# Patient Record
Sex: MALE | Race: WHITE | Employment: FULL TIME | URBAN - NONMETROPOLITAN AREA
[De-identification: names, ages, dates, MRNs, and addresses within clinical notes are randomized per-mention and may not be internally consistent; named-entity substitution may affect disease eponyms.]

---

## 2017-11-12 ENCOUNTER — HOSPITAL ENCOUNTER (EMERGENCY)
Age: 22
Discharge: HOME | End: 2017-11-12
Payer: COMMERCIAL

## 2017-11-12 VITALS
TEMPERATURE: 98.3 F | WEIGHT: 220 LBS | HEIGHT: 69 IN | SYSTOLIC BLOOD PRESSURE: 118 MMHG | RESPIRATION RATE: 16 BRPM | BODY MASS INDEX: 32.58 KG/M2 | DIASTOLIC BLOOD PRESSURE: 56 MMHG | OXYGEN SATURATION: 97 % | HEART RATE: 68 BPM

## 2017-11-12 DIAGNOSIS — M25.562 ACUTE PAIN OF LEFT KNEE: Primary | ICD-10-CM

## 2017-11-12 PROCEDURE — 99202 OFFICE O/P NEW SF 15 MIN: CPT | Performed by: NURSE PRACTITIONER

## 2017-11-12 PROCEDURE — 99202 OFFICE O/P NEW SF 15 MIN: CPT

## 2017-11-12 RX ORDER — NAPROXEN 500 MG/1
500 TABLET ORAL 2 TIMES DAILY WITH MEALS
Qty: 60 TABLET | Refills: 0 | Status: SHIPPED | OUTPATIENT
Start: 2017-11-12 | End: 2021-01-18 | Stop reason: ALTCHOICE

## 2017-11-12 RX ORDER — PREDNISONE 20 MG/1
20 TABLET ORAL 2 TIMES DAILY
Qty: 10 TABLET | Refills: 0 | Status: SHIPPED | OUTPATIENT
Start: 2017-11-12 | End: 2017-11-17

## 2017-11-12 ASSESSMENT — ENCOUNTER SYMPTOMS
CHEST TIGHTNESS: 0
DIARRHEA: 0
EYE PAIN: 0
BACK PAIN: 1
COUGH: 0
CONSTIPATION: 0
ABDOMINAL PAIN: 0
EYE REDNESS: 0
SHORTNESS OF BREATH: 0

## 2017-11-12 ASSESSMENT — PAIN SCALES - GENERAL: PAINLEVEL_OUTOF10: 4

## 2017-11-12 ASSESSMENT — PAIN DESCRIPTION - LOCATION: LOCATION: LEG

## 2017-11-12 NOTE — ED PROVIDER NOTES
[facility] [place] [facility]  Urgent Care Encounter      CHIEF COMPLAINT       Chief Complaint   Patient presents with    Leg Pain     left    Back Pain       Nurses Notes reviewed and I agree except as noted in the HPI. HISTORY OF PRESENT ILLNESS   Alize Tee is a 25 y.o. male who presents   Back Pain   Associated symptoms: no abdominal pain and no chest pain    Here for back and left leg pain. Has radiating pain from the hip to the knee. No recent falls or injuries. He denies any twisting or lifting injuries. 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 No surgeries to the back or the leg. Denies numbness or tingling. He was using a heating pad to the hip for some time and this has not helped. REVIEW OF SYSTEMS     Review of Systems   Constitutional: Positive for activity change. HENT: Negative. Eyes: Negative for pain and redness. Respiratory: Negative for cough, chest tightness and shortness of breath. Cardiovascular: Negative for chest pain. Gastrointestinal: Negative for abdominal pain, constipation and diarrhea. Endocrine: Negative for cold intolerance and heat intolerance. Genitourinary: Negative for difficulty urinating. Musculoskeletal: Positive for back pain. Skin: Negative for pallor. Allergic/Immunologic: Negative for environmental allergies and food allergies. Neurological: Negative for dizziness. Hematological: Negative for adenopathy. Psychiatric/Behavioral: Negative. PAST MEDICAL HISTORY   No past medical history on file. SURGICAL HISTORY     Patient  has a past surgical history that includes Appendectomy. CURRENT MEDICATIONS       Previous Medications    FLUTICASONE-SALMETEROL (ADVAIR HFA IN)    Inhale into the lungs       ALLERGIES     Patient is has No Known Allergies. FAMILY HISTORY     Patient's family history is not on file. SOCIAL HISTORY     Patient  reports that he has been smoking.   He does not have any smokeless tobacco SUPPORTS (KNEE BRACE/FLEX STAYS LARGE) MISC    1 Device by Does not apply route daily    NAPROXEN (NAPROSYN) 500 MG TABLET    Take 1 tablet by mouth 2 times daily (with meals)    PREDNISONE (DELTASONE) 20 MG TABLET    Take 1 tablet by mouth 2 times daily for 5 days     Current Discharge Medication List          NASEEM Tapia CNP  11/12/17 1242

## 2017-11-12 NOTE — ED NOTES
Pt discharged. Discharge assessments completed. No changes. All discharge education and information given. Pt instructed to go to ED for any shortness of breath, chest pain or abd pain. Verbalized understanding. Left stable.      Lily Donovan LPN  08/54/14 5506

## 2018-02-23 ENCOUNTER — APPOINTMENT (OUTPATIENT)
Dept: GENERAL RADIOLOGY | Age: 23
End: 2018-02-23
Payer: COMMERCIAL

## 2018-02-23 ENCOUNTER — HOSPITAL ENCOUNTER (EMERGENCY)
Age: 23
Discharge: HOME | End: 2018-02-23
Payer: COMMERCIAL

## 2018-02-23 VITALS
RESPIRATION RATE: 16 BRPM | OXYGEN SATURATION: 98 % | TEMPERATURE: 98.2 F | BODY MASS INDEX: 33.97 KG/M2 | DIASTOLIC BLOOD PRESSURE: 70 MMHG | HEART RATE: 98 BPM | SYSTOLIC BLOOD PRESSURE: 148 MMHG | WEIGHT: 230 LBS

## 2018-02-23 DIAGNOSIS — R03.0 ELEVATED BP WITHOUT DIAGNOSIS OF HYPERTENSION: ICD-10-CM

## 2018-02-23 DIAGNOSIS — R07.9 CHEST PAIN, UNSPECIFIED TYPE: Primary | ICD-10-CM

## 2018-02-23 LAB
ALBUMIN SERPL-MCNC: 4.5 G/DL (ref 3.5–5.1)
ALP BLD-CCNC: 42 U/L (ref 38–126)
ALT SERPL-CCNC: 74 U/L (ref 11–66)
AMPHETAMINE+METHAMPHETAMINE URINE SCREEN: NEGATIVE
ANION GAP SERPL CALCULATED.3IONS-SCNC: 14 MEQ/L (ref 8–16)
AST SERPL-CCNC: 45 U/L (ref 5–40)
BARBITURATE QUANTITATIVE URINE: NEGATIVE
BASOPHILS # BLD: 0.9 %
BASOPHILS ABSOLUTE: 0.1 THOU/MM3 (ref 0–0.1)
BENZODIAZEPINE QUANTITATIVE URINE: NEGATIVE
BILIRUB SERPL-MCNC: 0.6 MG/DL (ref 0.3–1.2)
BILIRUBIN DIRECT: < 0.2 MG/DL (ref 0–0.3)
BILIRUBIN URINE: NEGATIVE
BLOOD, URINE: NEGATIVE
BUN BLDV-MCNC: 10 MG/DL (ref 7–22)
CALCIUM SERPL-MCNC: 9.3 MG/DL (ref 8.5–10.5)
CANNABINOID QUANTITATIVE URINE: NEGATIVE
CHARACTER, URINE: CLEAR
CHLORIDE BLD-SCNC: 100 MEQ/L (ref 98–111)
CO2: 26 MEQ/L (ref 23–33)
COCAINE METABOLITE QUANTITATIVE URINE: NEGATIVE
COLOR: YELLOW
CREAT SERPL-MCNC: 0.8 MG/DL (ref 0.4–1.2)
D-DIMER QUANTITATIVE: < 215 NG/ML FEU (ref 0–500)
EKG ATRIAL RATE: 104 BPM
EKG P AXIS: 18 DEGREES
EKG P-R INTERVAL: 134 MS
EKG Q-T INTERVAL: 340 MS
EKG QRS DURATION: 68 MS
EKG QTC CALCULATION (BAZETT): 447 MS
EKG R AXIS: 58 DEGREES
EKG T AXIS: 9 DEGREES
EKG VENTRICULAR RATE: 104 BPM
EOSINOPHIL # BLD: 2.3 %
EOSINOPHILS ABSOLUTE: 0.2 THOU/MM3 (ref 0–0.4)
ETHYL ALCOHOL, SERUM: < 0.01 %
FLU A ANTIGEN: NEGATIVE
FLU B ANTIGEN: NEGATIVE
GFR SERPL CREATININE-BSD FRML MDRD: > 90 ML/MIN/1.73M2
GLUCOSE BLD-MCNC: 83 MG/DL (ref 70–108)
GLUCOSE URINE: NEGATIVE MG/DL
HCT VFR BLD CALC: 40.1 % (ref 42–52)
HEMOGLOBIN: 14.1 GM/DL (ref 14–18)
KETONES, URINE: ABNORMAL
LEUKOCYTE ESTERASE, URINE: NEGATIVE
LIPASE: 20.4 U/L (ref 5.6–51.3)
LYMPHOCYTES # BLD: 28.1 %
LYMPHOCYTES ABSOLUTE: 2.3 THOU/MM3 (ref 1–4.8)
MCH RBC QN AUTO: 30.6 PG (ref 27–31)
MCHC RBC AUTO-ENTMCNC: 35.1 GM/DL (ref 33–37)
MCV RBC AUTO: 87.1 FL (ref 80–94)
MONOCYTES # BLD: 7.9 %
MONOCYTES ABSOLUTE: 0.6 THOU/MM3 (ref 0.4–1.3)
NITRITE, URINE: NEGATIVE
NUCLEATED RED BLOOD CELLS: 0 /100 WBC
OPIATES, URINE: NEGATIVE
OSMOLALITY CALCULATION: 277.6 MOSMOL/KG (ref 275–300)
OXYCODONE: NEGATIVE
PDW BLD-RTO: 12.5 % (ref 11.5–14.5)
PH UA: 6
PHENCYCLIDINE QUANTITATIVE URINE: NEGATIVE
PLATELET # BLD: 216 THOU/MM3 (ref 130–400)
PMV BLD AUTO: 9 FL (ref 7.4–10.4)
POTASSIUM SERPL-SCNC: 3.5 MEQ/L (ref 3.5–5.2)
PROTEIN UA: NEGATIVE
RBC # BLD: 4.61 MILL/MM3 (ref 4.7–6.1)
SEG NEUTROPHILS: 60.8 %
SEGMENTED NEUTROPHILS ABSOLUTE COUNT: 5 THOU/MM3 (ref 1.8–7.7)
SODIUM BLD-SCNC: 140 MEQ/L (ref 135–145)
SPECIFIC GRAVITY, URINE: 1.02 (ref 1–1.03)
TOTAL PROTEIN: 6.9 G/DL (ref 6.1–8)
TROPONIN T: < 0.01 NG/ML
TROPONIN T: < 0.01 NG/ML
TSH SERPL DL<=0.05 MIU/L-ACNC: 2.79 UIU/ML (ref 0.4–4.2)
UROBILINOGEN, URINE: 1 EU/DL
WBC # BLD: 8.2 THOU/MM3 (ref 4.8–10.8)

## 2018-02-23 PROCEDURE — 87804 INFLUENZA ASSAY W/OPTIC: CPT

## 2018-02-23 PROCEDURE — 83690 ASSAY OF LIPASE: CPT

## 2018-02-23 PROCEDURE — 81003 URINALYSIS AUTO W/O SCOPE: CPT

## 2018-02-23 PROCEDURE — 84484 ASSAY OF TROPONIN QUANT: CPT

## 2018-02-23 PROCEDURE — 84443 ASSAY THYROID STIM HORMONE: CPT

## 2018-02-23 PROCEDURE — 71046 X-RAY EXAM CHEST 2 VIEWS: CPT

## 2018-02-23 PROCEDURE — G0480 DRUG TEST DEF 1-7 CLASSES: HCPCS

## 2018-02-23 PROCEDURE — 80307 DRUG TEST PRSMV CHEM ANLYZR: CPT

## 2018-02-23 PROCEDURE — 82248 BILIRUBIN DIRECT: CPT

## 2018-02-23 PROCEDURE — 93005 ELECTROCARDIOGRAM TRACING: CPT | Performed by: PHYSICIAN ASSISTANT

## 2018-02-23 PROCEDURE — 85025 COMPLETE CBC W/AUTO DIFF WBC: CPT

## 2018-02-23 PROCEDURE — 85379 FIBRIN DEGRADATION QUANT: CPT

## 2018-02-23 PROCEDURE — 36415 COLL VENOUS BLD VENIPUNCTURE: CPT

## 2018-02-23 PROCEDURE — 99285 EMERGENCY DEPT VISIT HI MDM: CPT

## 2018-02-23 PROCEDURE — 80053 COMPREHEN METABOLIC PANEL: CPT

## 2018-02-23 RX ORDER — KETOROLAC TROMETHAMINE 30 MG/ML
30 INJECTION, SOLUTION INTRAMUSCULAR; INTRAVENOUS ONCE
Status: DISCONTINUED | OUTPATIENT
Start: 2018-02-23 | End: 2018-02-24 | Stop reason: HOSPADM

## 2018-02-23 RX ORDER — IBUPROFEN 600 MG/1
600 TABLET ORAL EVERY 6 HOURS PRN
Qty: 30 TABLET | Refills: 0 | Status: SHIPPED | OUTPATIENT
Start: 2018-02-23 | End: 2021-01-18 | Stop reason: ALTCHOICE

## 2018-02-23 ASSESSMENT — ENCOUNTER SYMPTOMS
NAUSEA: 0
ABDOMINAL PAIN: 0
BACK PAIN: 0
COUGH: 0
COLOR CHANGE: 0
SORE THROAT: 0
CONSTIPATION: 0
DIARRHEA: 0
EYE REDNESS: 0
VOMITING: 0
EYE DISCHARGE: 0
WHEEZING: 0
RHINORRHEA: 0

## 2018-02-24 PROCEDURE — 93010 ELECTROCARDIOGRAM REPORT: CPT | Performed by: INTERNAL MEDICINE

## 2018-02-24 ASSESSMENT — HEART SCORE: ECG: 1

## 2018-02-24 ASSESSMENT — ENCOUNTER SYMPTOMS: SHORTNESS OF BREATH: 1

## 2018-02-24 NOTE — ED PROVIDER NOTES
this chart in the absence of a cardiologist.    EKG 12 Lead (Edited Result - FINAL)   Component (Lab Inquiry)   Collection Time Result Time Ventricular Rate Atrial Rate P-R Interval QRS Duration Q-T Interval   02/23/18 20:41:42 02/23/18 20:41:42 104 104 134 68 340       Collection Time Result Time QTc Calculation (Bazett) P Axis R Axis T Axis   02/23/18 20:41:42 02/23/18 20:41:42 447 18 58 9       Final result                Narrative:    Sinus tachycardia  Nonspecific T wave abnormality  Abnormal ECG  No previous ECGs available  Confirmed by [address] [place] [address] ([id number]) on 2/24/2018 1:01:36 AM                RADIOLOGY: non-plain film images(s) such as CT, Ultrasound and MRI are read by the radiologist.    XR CHEST STANDARD (2 VW)   Final Result      No acute cardiopulmonary disease. **This report has been created using voice recognition software. It may contain minor errors which are inherent in voice recognition technology. **      Final report electronically signed by Dr. Marjorie Nielson on 2/23/2018 9:44 PM          LABS:     Labs Reviewed   CBC WITH AUTO DIFFERENTIAL - Abnormal; Notable for the following:        Result Value    RBC 4.61 (*)     Hematocrit 40.1 (*)     All other components within normal limits   HEPATIC FUNCTION PANEL - Abnormal; Notable for the following:     AST 45 (*)     ALT 74 (*)     All other components within normal limits   URINE RT REFLEX TO CULTURE - Abnormal; Notable for the following:     Ketones, Urine TRACE (*)     All other components within normal limits   RAPID INFLUENZA A/B ANTIGENS   D-DIMER, QUANTITATIVE   BASIC METABOLIC PANEL   LIPASE   TROPONIN   TSH WITHOUT REFLEX   URINE DRUG SCREEN   ETHANOL   ANION GAP   GLOMERULAR FILTRATION RATE, ESTIMATED   OSMOLALITY   TROPONIN       EMERGENCY DEPARTMENT COURSE:   Vitals:    Vitals:    02/23/18 2042 02/23/18 2129 02/23/18 2214 02/23/18 2337   BP: (!) 158/96 132/74 (!) 126/58 (!) 148/70   Pulse: 103 101 95 98   Resp: 12 18 16 16 He will otherwise follow up at a scheduled Cardiology outpatient follow up appointment on 2/26/18 at 1000. I estimate there is LOW risk for PULMONARY EMBOLISM, ACUTE CORONARY SYNDROME, OR THORACIC AORTIC DISSECTION, thus I consider the discharge disposition reasonable. The patient and I have discussed the diagnosis and risks, and we agree with discharging home to follow-up with his primary doctor. We also discussed returning to the Emergency Department immediately if new or worsening symptoms occur. We have discussed the symptoms which are most concerning (e.g., bloody sputum, fever, worsening pain or shortness of breath, vomiting) that necessitate immediate return. CRITICAL CARE:   None     CONSULTS:  None    PROCEDURES:  None    FINAL IMPRESSION      1. Chest pain, unspecified type    2. Elevated BP without diagnosis of hypertension          DISPOSITION/PLAN   I have given the patient strict written and verbal instructions about care at home, follow-up, and signs and symptoms of worsening of condition, and the patient did verbalize understanding of these instructions. Patient was discharged in stable condition. Will return if symptoms change or worsen, or for any sign or symptom deemed emergent by the patient or family members. Follow up as an outpatient or sooner if symptoms warrant.      PATIENT REFERRED TO:  [facility] [address] [place] [address]  Go on 2/26/2018  For re-check      DISCHARGE MEDICATIONS:  Discharge Medication List as of 2/23/2018 11:45 PM      START taking these medications    Details   ibuprofen (ADVIL;MOTRIN) 600 MG tablet Take 1 tablet by mouth every 6 hours as needed for Pain, Disp-30 tablet, R-0Print             (Please note that portions of this note were completed with a voice recognition program.  Efforts were made to edit the dictations but occasionally words are mis-transcribed.)    The patient was given an opportunity to see the

## 2018-02-24 NOTE — ED NOTES
Offered pain medication to patient. Patient refused pain medication and stated he would let me know if he needed medication assistance.      Madalyn Adams RN  02/23/18 6972

## 2019-08-23 ENCOUNTER — HOSPITAL ENCOUNTER (EMERGENCY)
Age: 24
Discharge: HOME | End: 2019-08-23
Attending: EMERGENCY MEDICINE
Payer: COMMERCIAL

## 2019-08-23 VITALS
OXYGEN SATURATION: 98 % | RESPIRATION RATE: 16 BRPM | HEART RATE: 81 BPM | BODY MASS INDEX: 33.97 KG/M2 | SYSTOLIC BLOOD PRESSURE: 127 MMHG | TEMPERATURE: 97.1 F | WEIGHT: 230 LBS | DIASTOLIC BLOOD PRESSURE: 66 MMHG

## 2019-08-23 DIAGNOSIS — F17.200 TOBACCO USE DISORDER: ICD-10-CM

## 2019-08-23 DIAGNOSIS — R51.9 INTRACTABLE HEADACHE, UNSPECIFIED CHRONICITY PATTERN, UNSPECIFIED HEADACHE TYPE: Primary | ICD-10-CM

## 2019-08-23 PROCEDURE — 99214 OFFICE O/P EST MOD 30 MIN: CPT | Performed by: EMERGENCY MEDICINE

## 2019-08-23 PROCEDURE — 99213 OFFICE O/P EST LOW 20 MIN: CPT

## 2019-08-23 RX ORDER — TRAMADOL HYDROCHLORIDE 50 MG/1
50 TABLET ORAL EVERY 4 HOURS PRN
Qty: 12 TABLET | Refills: 0 | Status: SHIPPED | OUTPATIENT
Start: 2019-08-23 | End: 2019-08-28

## 2019-08-23 RX ORDER — ONDANSETRON 4 MG/1
4 TABLET, ORALLY DISINTEGRATING ORAL 4 TIMES DAILY PRN
Qty: 12 TABLET | Refills: 0 | Status: SHIPPED | OUTPATIENT
Start: 2019-08-23 | End: 2019-09-04

## 2019-08-23 ASSESSMENT — ENCOUNTER SYMPTOMS
COUGH: 0
DIARRHEA: 0
SORE THROAT: 0
TROUBLE SWALLOWING: 0
BACK PAIN: 0
EYE REDNESS: 0
SHORTNESS OF BREATH: 0
EYE DISCHARGE: 0
ABDOMINAL PAIN: 0
EYE PAIN: 0
NAUSEA: 1
SINUS PRESSURE: 0
VOICE CHANGE: 0
STRIDOR: 0
WHEEZING: 0
VOMITING: 0

## 2019-08-23 NOTE — ED PROVIDER NOTES
[facility] Encounter      [facility]       Chief Complaint   Patient presents with    Headache     frontal headaches, nausea,        Nurses Notes reviewed and I agree except as noted in the HPI. HISTORY OF PRESENT ILLNESS   Tala Jeronimo is a 21 y.o. male who presents with 1 week history of increasingly severe bifrontal headache, nausea. He rates pain up to 5 out of 10 severity. He has experienced these headaches for several years, diagnosis not determined. He denies any pain at this time. Some photophobia, sneezing and postnasal drainage. No fever, vomiting, abdominal pain, syncope, shortness of breath, chest pain, diarrhea, sore throat, ear pain. Smokes cigarettes. No history of asthma, diabetes, ICH, CVA. REVIEW OF SYSTEMS     Review of Systems   Constitutional: Negative for appetite change, chills, fatigue, fever and unexpected weight change. HENT: Positive for postnasal drip and sneezing. Negative for congestion, ear discharge, ear pain, sinus pressure, sore throat, trouble swallowing and voice change. Eyes: Negative for pain, discharge and redness. Respiratory: Negative for cough, shortness of breath, wheezing and stridor. Cardiovascular: Negative for chest pain and leg swelling. Gastrointestinal: Positive for nausea. Negative for abdominal pain, diarrhea and vomiting. Genitourinary: Negative for dysuria, frequency, hematuria and urgency. Musculoskeletal: Negative for arthralgias, back pain, myalgias and neck pain. Skin: Negative for rash. Neurological: Positive for headaches. Negative for dizziness, syncope and weakness. Hematological: Negative for adenopathy. Psychiatric/Behavioral: Negative for behavioral problems, confusion, sleep disturbance and suicidal ideas. The patient is not nervous/anxious. All other systems reviewed and are negative.       PAST MEDICAL HISTORY         Diagnosis Date    Asthma        SURGICAL HISTORY     Patient  has a past surgical history that includes Appendectomy and Facial cosmetic surgery. CURRENT MEDICATIONS       Discharge Medication List as of 8/23/2019  6:25 PM      CONTINUE these medications which have NOT CHANGED    Details   ibuprofen (ADVIL;MOTRIN) 600 MG tablet Take 1 tablet by mouth every 6 hours as needed for Pain, Disp-30 tablet, R-0Print      Fluticasone-Salmeterol (ADVAIR HFA IN) Inhale into the lungsHistorical Med      naproxen (NAPROSYN) 500 MG tablet Take 1 tablet by mouth 2 times daily (with meals), Disp-60 tablet, R-0Print      Elastic Bandages & Supports (KNEE BRACE/FLEX STAYS LARGE) MISC DAILY Starting Sun 11/12/2017, Disp-1 each, R-0, Print             ALLERGIES     Patient is has No Known Allergies. FAMILY HISTORY     Patient'sfamily history includes Diabetes in his maternal grandmother. SOCIAL HISTORY     Patient  reports that he has been smoking cigarettes. He has been smoking about 0.50 packs per day. He has never used smokeless tobacco. He reports that he drinks alcohol. He reports that he has current or past drug history. Drug: Marijuana. PHYSICAL EXAM     ED TRIAGE VITALS  BP: 127/66, Temp: 97.1 °F (36.2 °C), Pulse: 81, Resp: 16, SpO2: 98 %  Physical Exam   Constitutional: He is oriented to person, place, and time. He appears well-developed and well-nourished. No distress. Moist membranes, normal airway   HENT:   Head: Normocephalic and atraumatic. Right Ear: Tympanic membrane and external ear normal.   Left Ear: Tympanic membrane and external ear normal.   Nose: Nose normal. Right sinus exhibits no maxillary sinus tenderness and no frontal sinus tenderness. Left sinus exhibits no maxillary sinus tenderness and no frontal sinus tenderness. Mouth/Throat: Oropharynx is clear and moist. No trismus in the jaw. No uvula swelling. No oropharyngeal exudate, posterior oropharyngeal edema, posterior oropharyngeal erythema or tonsillar abscesses.    Eyes: Pupils 1744   BP: 127/66   Pulse: 81   Resp: 16   Temp: 97.1 °F (36.2 °C)   TempSrc: Temporal   SpO2: 98%   Weight: 230 lb (104.3 kg)       Medications - No data to display  PROCEDURES:  None  FINALIMPRESSION      1. Intractable headache, unspecified chronicity pattern, unspecified headache type    2. Tobacco use disorder        DISPOSITION/PLAN   DISPOSITION Decision To Discharge 08/23/2019 06:20:44 PM  Nontoxic, well-hydrated, normal airway. No airway abscess or epiglottitis, sepsis, CNS infection, pneumonia, hypoxia, bronchospasm. No ICH, CVA, increased intracranial pressure, CNS mass lesion. Head pain is unclear as to etiology. Will require outpatient diagnostic evaluation. .  Will treat with tramadol, Tylenol, Zofran, rest in dark, quiet, cool room, with increased oral clear liquids. Patient given PCP referral per his request.  Patient understands to go to ED if worse. In addition, patient understands importance of smoke cessation and was given written instructions to quit smoking  PATIENT REFERRED TO:  LIANET Andrade Cera - CNP  [address] [place] [id number]  [phone]    Schedule an appointment as soon as possible for a visit in 5 days  Recheck in office, go to emergency if worse    DISCHARGE MEDICATIONS:  Discharge Medication List as of 8/23/2019  6:25 PM      START taking these medications    Details   traMADol (ULTRAM) 50 MG tablet Take 1 tablet by mouth every 4 hours as needed for Pain (Take 500 mg Tylenol with every tramadol every 4 hours) for up to 12 doses. Intended supply: 3 days.  Take lowest dose possible to manage pain, Disp-12 tablet, R-0Print      ondansetron (ZOFRAN ODT) 4 MG disintegrating tablet Take 1 tablet by mouth 4 times daily as needed for Nausea or Vomiting (Dissolve on tongue 4 times daily for nausea or vomiting), Disp-12 tablet, R-0Print           Discharge Medication List as of 8/23/2019  6:25 PM          MD Javier Spring MD  08/23/19 8432

## 2019-08-26 ENCOUNTER — HOSPITAL ENCOUNTER (EMERGENCY)
Age: 24
Discharge: HOME | End: 2019-08-26
Attending: EMERGENCY MEDICINE
Payer: COMMERCIAL

## 2019-08-26 ENCOUNTER — APPOINTMENT (OUTPATIENT)
Dept: GENERAL RADIOLOGY | Age: 24
End: 2019-08-26
Payer: COMMERCIAL

## 2019-08-26 VITALS
OXYGEN SATURATION: 99 % | DIASTOLIC BLOOD PRESSURE: 75 MMHG | SYSTOLIC BLOOD PRESSURE: 122 MMHG | BODY MASS INDEX: 34.07 KG/M2 | TEMPERATURE: 99.1 F | HEART RATE: 71 BPM | HEIGHT: 69 IN | WEIGHT: 230 LBS | RESPIRATION RATE: 18 BRPM

## 2019-08-26 DIAGNOSIS — R11.2 NON-INTRACTABLE VOMITING WITH NAUSEA, UNSPECIFIED VOMITING TYPE: Primary | ICD-10-CM

## 2019-08-26 LAB
ALBUMIN SERPL-MCNC: 4.6 G/DL (ref 3.5–5.1)
ALP BLD-CCNC: 46 U/L (ref 38–126)
ALT SERPL-CCNC: 58 U/L (ref 11–66)
ANION GAP SERPL CALCULATED.3IONS-SCNC: 12 MEQ/L (ref 8–16)
AST SERPL-CCNC: 36 U/L (ref 5–40)
BASOPHILS # BLD: 0.8 %
BASOPHILS ABSOLUTE: 0.1 THOU/MM3 (ref 0–0.1)
BILIRUB SERPL-MCNC: 0.3 MG/DL (ref 0.3–1.2)
BUN BLDV-MCNC: 8 MG/DL (ref 7–22)
CALCIUM SERPL-MCNC: 9.4 MG/DL (ref 8.5–10.5)
CHLORIDE BLD-SCNC: 102 MEQ/L (ref 98–111)
CO2: 26 MEQ/L (ref 23–33)
CREAT SERPL-MCNC: 0.8 MG/DL (ref 0.4–1.2)
EOSINOPHIL # BLD: 3.9 %
EOSINOPHILS ABSOLUTE: 0.4 THOU/MM3 (ref 0–0.4)
ERYTHROCYTE [DISTWIDTH] IN BLOOD BY AUTOMATED COUNT: 12 % (ref 11.5–14.5)
ERYTHROCYTE [DISTWIDTH] IN BLOOD BY AUTOMATED COUNT: 38.2 FL (ref 35–45)
GFR SERPL CREATININE-BSD FRML MDRD: > 90 ML/MIN/1.73M2
GLUCOSE BLD-MCNC: 91 MG/DL (ref 70–108)
HCT VFR BLD CALC: 46.9 % (ref 42–52)
HEMOGLOBIN: 15.8 GM/DL (ref 14–18)
IMMATURE GRANS (ABS): 0.02 THOU/MM3 (ref 0–0.07)
IMMATURE GRANULOCYTES: 0 %
LIPASE: 14.5 U/L (ref 5.6–51.3)
LYMPHOCYTES # BLD: 26.1 %
LYMPHOCYTES ABSOLUTE: 2.8 THOU/MM3 (ref 1–4.8)
MCH RBC QN AUTO: 29.3 PG (ref 26–33)
MCHC RBC AUTO-ENTMCNC: 33.7 GM/DL (ref 32.2–35.5)
MCV RBC AUTO: 87 FL (ref 80–94)
MONOCYTES # BLD: 5.7 %
MONOCYTES ABSOLUTE: 0.6 THOU/MM3 (ref 0.4–1.3)
NUCLEATED RED BLOOD CELLS: 0 /100 WBC
OSMOLALITY CALCULATION: 277.3 MOSMOL/KG (ref 275–300)
PLATELET # BLD: 253 THOU/MM3 (ref 130–400)
PMV BLD AUTO: 10.6 FL (ref 9.4–12.4)
POTASSIUM SERPL-SCNC: 3.9 MEQ/L (ref 3.5–5.2)
RBC # BLD: 5.39 MILL/MM3 (ref 4.7–6.1)
SEG NEUTROPHILS: 63.3 %
SEGMENTED NEUTROPHILS ABSOLUTE COUNT: 6.7 THOU/MM3 (ref 1.8–7.7)
SODIUM BLD-SCNC: 140 MEQ/L (ref 135–145)
TOTAL PROTEIN: 7.6 G/DL (ref 6.1–8)
WBC # BLD: 10.6 THOU/MM3 (ref 4.8–10.8)

## 2019-08-26 PROCEDURE — 80053 COMPREHEN METABOLIC PANEL: CPT

## 2019-08-26 PROCEDURE — 83690 ASSAY OF LIPASE: CPT

## 2019-08-26 PROCEDURE — 74022 RADEX COMPL AQT ABD SERIES: CPT

## 2019-08-26 PROCEDURE — 6360000002 HC RX W HCPCS: Performed by: EMERGENCY MEDICINE

## 2019-08-26 PROCEDURE — 96374 THER/PROPH/DIAG INJ IV PUSH: CPT

## 2019-08-26 PROCEDURE — 99283 EMERGENCY DEPT VISIT LOW MDM: CPT

## 2019-08-26 PROCEDURE — 85025 COMPLETE CBC W/AUTO DIFF WBC: CPT

## 2019-08-26 PROCEDURE — 36415 COLL VENOUS BLD VENIPUNCTURE: CPT

## 2019-08-26 RX ORDER — PROMETHAZINE HYDROCHLORIDE 25 MG/1
25 TABLET ORAL EVERY 6 HOURS PRN
Qty: 20 TABLET | Refills: 0 | Status: SHIPPED | OUTPATIENT
Start: 2019-08-26 | End: 2019-09-02

## 2019-08-26 RX ORDER — ONDANSETRON 2 MG/ML
4 INJECTION INTRAMUSCULAR; INTRAVENOUS ONCE
Status: COMPLETED | OUTPATIENT
Start: 2019-08-26 | End: 2019-08-26

## 2019-08-26 RX ADMIN — ONDANSETRON 4 MG: 2 INJECTION INTRAMUSCULAR; INTRAVENOUS at 09:49

## 2019-08-26 ASSESSMENT — ENCOUNTER SYMPTOMS
NAUSEA: 1
RHINORRHEA: 0
WHEEZING: 0
ABDOMINAL PAIN: 0
BLOOD IN STOOL: 0
COUGH: 0
VOMITING: 1
SORE THROAT: 0
CONSTIPATION: 0
EYE REDNESS: 0
EYE DISCHARGE: 0
SHORTNESS OF BREATH: 0
DIARRHEA: 0
BACK PAIN: 0

## 2019-08-26 NOTE — ED PROVIDER NOTES
father is alive. He indicated that his maternal grandmother is alive. family history includes Diabetes in his maternal grandmother. SOCIAL HISTORY      reports that he has been smoking cigarettes. He has been smoking about 0.50 packs per day. He has never used smokeless tobacco. He reports that he drinks alcohol. He reports that he has current or past drug history. Drug: Marijuana. PHYSICAL EXAM     INITIAL VITALS:  height is 5' 9\" (1.753 m) and weight is 230 lb (104.3 kg). His oral temperature is 99.1 °F (37.3 °C). His blood pressure is 122/75 and his pulse is 71. His respiration is 18 and oxygen saturation is 99%. CONSTITUTIONAL: [Awake, alert, non toxic, well developed, well nourished, no acute distress]  HEAD: [Normocephalic, atraumatic]  EYES: [Pupils equal, round & reactive to light, extraocular movements intact, no nystagmus, clear conjunctiva, non-icteric sclera]  ENT: [External ear canal clear without evidence of cerumen impaction or foreign body, TM's clear without erythema or bulging. Nares patent without drainage, septum appears midline. Moist mucus membranes, oropharynx clear without exudate, erythema, or mass. Uvula midline]  NECK: [Nontender and supple. No meningismus, no appreciated lymphadenopathy. Intact full range of motion. C-spine midline without vertebral tenderness. Trachea midline.]  CHEST: [Inspection normal, no lesions, equal rise. No crepitus or tenderness upon palpation.]  CARDIOVASCULAR: [Regular rate, rhythm, normal S1 and S2. No appreciated murmurs, rubs, or gallops. No pulse deficits appreciated. Intact distal perfusion. JVD not appreciated.]  PULMONARY: [Respiratory distress absent. Respiratory effort normal. Breath sounds clear to auscultation without rhonchi, rales, or wheezing. No accessory muscle use. No stridor]  ABDOMEN: [Inspection normal, without surgical scars. Soft, non-tender, non-distended, with normoactive bowel sounds.  No palpable masses, rebound, or 99%   Weight: 230 lb (104.3 kg)    Height: 5' 9\" (1.753 m)      The results of pertinent diagnostic studies and exam findings were discussed. The patients provisional diagnosis and plan of care were discussed with the patient and present family. The patient and/or present family expressed understanding of the diagnosis and plan. The nurse was instructed to provide written instructions and appropriate follow-up information. The patient understands their need and responsibility to obtain additional follow-up as instructed. The patient is comfortable with the plan and discharge. The risks of medications administered and prescribed were discussed with the patient and family present. CRITICAL CARE:   None    CONSULTS:  None    PROCEDURES:  None    FINAL IMPRESSION      1. Non-intractable vomiting with nausea, unspecified vomiting type          DISPOSITION/PLAN   discharge    PATIENT REFERRED TO:    Be sure to keep your appointment with the [facility] Medicine clinic tomorrow. On 8/27/2019  Keep your appointment at [address] [place] [address] Road:  Discharge Medication List as of 8/26/2019 10:43 AM      START taking these medications    Details   promethazine (PHENERGAN) 25 MG tablet Take 1 tablet by mouth every 6 hours as needed for Nausea WARNING:  May cause drowsiness. May impair ability to operate vehicles or machinery.   Do not use in combination with alcohol., Disp-20 tablet, R-0Print             (Please note that portions ofthis note were completed with a voice recognition program.  Efforts were made to edit the dictations but occasionally words are mis-transcribed.)    Scribe:  Lionel Bose 8/26/19 9:45 AM Scribing for and in the presence of Shantanu Willoughby MD.    Signed by: Jack Carolina, 08/26/19 4:53 PM    Provider:  I personally performed the services described in the documentation, reviewed and edited the documentation which was dictated to the scribe in my presence, and it accurately records my

## 2019-08-27 ENCOUNTER — OFFICE VISIT (OUTPATIENT)
Dept: FAMILY MEDICINE CLINIC | Age: 24
End: 2019-08-27
Payer: COMMERCIAL

## 2019-08-27 VITALS
DIASTOLIC BLOOD PRESSURE: 63 MMHG | SYSTOLIC BLOOD PRESSURE: 126 MMHG | HEIGHT: 68 IN | WEIGHT: 226.6 LBS | HEART RATE: 86 BPM | OXYGEN SATURATION: 97 % | RESPIRATION RATE: 12 BRPM | BODY MASS INDEX: 34.34 KG/M2 | TEMPERATURE: 98.4 F

## 2019-08-27 DIAGNOSIS — K21.9 GASTROESOPHAGEAL REFLUX DISEASE, ESOPHAGITIS PRESENCE NOT SPECIFIED: Primary | ICD-10-CM

## 2019-08-27 DIAGNOSIS — Z78.9 VARICELLA VACCINATION STATUS UNKNOWN: ICD-10-CM

## 2019-08-27 DIAGNOSIS — Z11.4 SCREENING FOR HIV (HUMAN IMMUNODEFICIENCY VIRUS): ICD-10-CM

## 2019-08-27 DIAGNOSIS — R11.2 NAUSEA AND VOMITING, INTRACTABILITY OF VOMITING NOT SPECIFIED, UNSPECIFIED VOMITING TYPE: ICD-10-CM

## 2019-08-27 PROBLEM — J45.909 ASTHMA: Status: ACTIVE | Noted: 2019-08-27

## 2019-08-27 LAB — HBA1C MFR BLD: 5.4 % (ref 4.3–5.7)

## 2019-08-27 PROCEDURE — 99204 OFFICE O/P NEW MOD 45 MIN: CPT | Performed by: STUDENT IN AN ORGANIZED HEALTH CARE EDUCATION/TRAINING PROGRAM

## 2019-08-27 RX ORDER — OMEPRAZOLE 40 MG/1
40 CAPSULE, DELAYED RELEASE ORAL
Qty: 90 CAPSULE | Refills: 1 | Status: SHIPPED | OUTPATIENT
Start: 2019-08-27 | End: 2021-01-18 | Stop reason: ALTCHOICE

## 2019-08-27 ASSESSMENT — ENCOUNTER SYMPTOMS
VOMITING: 1
SINUS PRESSURE: 0
DIARRHEA: 0
SHORTNESS OF BREATH: 0
NAUSEA: 1
BLOOD IN STOOL: 0
SINUS PAIN: 0
EYE PAIN: 0
SORE THROAT: 0
BACK PAIN: 0
COUGH: 0
CONSTIPATION: 0
ABDOMINAL PAIN: 0

## 2019-08-27 ASSESSMENT — PATIENT HEALTH QUESTIONNAIRE - PHQ9
SUM OF ALL RESPONSES TO PHQ9 QUESTIONS 1 & 2: 0
2. FEELING DOWN, DEPRESSED OR HOPELESS: 0
1. LITTLE INTEREST OR PLEASURE IN DOING THINGS: 0
SUM OF ALL RESPONSES TO PHQ QUESTIONS 1-9: 0
SUM OF ALL RESPONSES TO PHQ QUESTIONS 1-9: 0

## 2019-08-27 NOTE — PROGRESS NOTES
S: 21 y.o. male with   Chief Complaint   Patient presents with   [facility] Established New Doctor    ED Follow-up     VOMITING       HPI: pt reports no abd pain but episodic nausea and vomiting, improved at times with eating. X 1 wk. Getting worse. waterbrash sensation noted when he feels nauseous. Aversion to cigarettes that he normally smokes. BP Readings from Last 3 Encounters:   08/27/19 126/63   08/26/19 122/75   08/23/19 127/66     Wt Readings from Last 3 Encounters:   08/27/19 226 lb 9.6 oz (102.8 kg)   08/26/19 230 lb (104.3 kg)   08/23/19 230 lb (104.3 kg)           O: VS:  height is 5' 7.91\" (1.725 m) and weight is 226 lb 9.6 oz (102.8 kg). His oral temperature is 98.4 °F (36.9 °C). His blood pressure is 126/63 and his pulse is 86. His respiration is 12 and oxygen saturation is 97%. AAO/NAD, appropriate affect for mood  CV:  RRR, no murmur  Resp: CTAB  abd soft, tender epigastrium. Diagnosis Orders   1. Gastroesophageal reflux disease, esophagitis presence not specified  omeprazole (PRILOSEC) 40 MG delayed release capsule   2. Nausea and vomiting, intractability of vomiting not specified, unspecified vomiting type  Hepatic Function Panel    Lipid Panel    Magnesium    T4    TSH with Reflex    Vitamin D 25 Hydroxy    Hepatitis Panel, Acute   3. Screening for HIV (human immunodeficiency virus)  HIV Screen   4. Varicella vaccination status unknown  Varicella Zoster Antibody, IgG    Varicella Zoster Antibody, IgM       Plan:  Start prilosec 40mg daily assuming this is GERD with vagus stimulation. Continue zofran prn. F/u 2 wks. Consider GI referral if not improving. Hepatitis panel looking for Hep A due to sudden smoking aversion.        Health Maintenance Due   Topic Date Due    Pneumococcal 0-64 years Vaccine (1 of 1 - PPSV23) 10/19/2001    Varicella Vaccine (1 of 2 - 13+ 2-dose series) 10/19/2008    HIV screen  10/19/2010    DTaP/Tdap/Td vaccine (1 - Tdap) 10/19/2014         Attending

## 2019-08-27 NOTE — PROGRESS NOTES
Delfino Hammans is a 21 y.o. male who presents today for:  Chief Complaint   Patient presents with   [employer] New Doctor    ED Follow-up     VOMITING       Goals      Exercise 3x per week (30 min per time)           HPI:   New patient here to establish care, ED F/U    Nausea and vomiting x 1 week, ED yesterday    Started at work with his stomach \"churning\" feeling like he was on a roller coaster, felt nauseous, vomited. Came on suddenly. States he ate some food and then symptoms got better. Thought it may have to do do with blood sugars. Went to Urgent Care last week and was given Tramadol for pain, but never took it. Went to the ED yesterday because he still did not feel well and was sent home with Zofran after all labs and tests were done. States he still has symptoms and is not feeling well. Had soup this AM and it came up. Not associated with food, feels better with food. States certain smells will make him nauseous. States he got hot flashes at work the first episode, but not after that. Vomitus contains food material, not clear, no blood. Feels feverish and warm, but did not take temperature at home. No travel recently and sick contacts at home. 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 Smoker, but does not feel like smoking since symptoms started. Does not like the taste of cigs. No question data found.     Past Medical History:   Diagnosis Date    Asthma       Past Surgical History:   Procedure Laterality Date    APPENDECTOMY      FACIAL COSMETIC SURGERY       Family History   Problem Relation Age of Onset    Diabetes Maternal Grandmother      Social History     Tobacco Use    Smoking status: Current Every Day Smoker     Packs/day: 0.50     Types: Cigarettes    Smokeless tobacco: Never Used   Substance Use Topics    Alcohol use: Yes     Comment: occasional      Current Outpatient Medications   Medication Sig Dispense Refill    traMADol (ULTRAM) 50 MG tablet Take 1 tablet by mouth every 4 hours as needed for Pain (Take 500 mg Tylenol with every tramadol every 4 hours) for up to 12 doses. Intended supply: 3 days. Take lowest dose possible to manage pain 12 tablet 0    ondansetron (ZOFRAN ODT) 4 MG disintegrating tablet Take 1 tablet by mouth 4 times daily as needed for Nausea or Vomiting (Dissolve on tongue 4 times daily for nausea or vomiting) 12 tablet 0    ibuprofen (ADVIL;MOTRIN) 600 MG tablet Take 1 tablet by mouth every 6 hours as needed for Pain (Patient taking differently: Take 200 mg by mouth every 6 hours as needed for Pain ) 30 tablet 0    Elastic Bandages & Supports (KNEE BRACE/FLEX STAYS LARGE) MISC 1 Device by Does not apply route daily 1 each 0    promethazine (PHENERGAN) 25 MG tablet Take 1 tablet by mouth every 6 hours as needed for Nausea WARNING:  May cause drowsiness. May impair ability to operate vehicles or machinery. Do not use in combination with alcohol. (Patient not taking: Reported on 8/27/2019) 20 tablet 0    Fluticasone-Salmeterol (ADVAIR HFA IN) Inhale into the lungs      naproxen (NAPROSYN) 500 MG tablet Take 1 tablet by mouth 2 times daily (with meals) (Patient not taking: Reported on 8/27/2019) 60 tablet 0     No current facility-administered medications for this visit. Allergies   Allergen Reactions    Bee Venom Swelling       Health Maintenance   Topic Date Due    Pneumococcal 0-64 years Vaccine (1 of 1 - PPSV23) 10/19/2001    Varicella Vaccine (1 of 2 - 13+ 2-dose series) 10/19/2008    HIV screen  10/19/2010    DTaP/Tdap/Td vaccine (1 - Tdap) 10/19/2014    Flu vaccine (1) 09/01/2019    HPV vaccine  Aged Out       Subjective:      Review of Systems   Constitutional: Positive for appetite change. Negative for fatigue and fever. HENT: Negative for sinus pressure, sinus pain and sore throat. Eyes: Negative for pain and visual disturbance. Respiratory: Negative for cough and shortness of breath.

## 2020-06-01 NOTE — PATIENT INSTRUCTIONS
Thank you   1. Thank you for trusting us with your healthcare needs. You may receive a survey regarding today's visit. It would help us out if you would take a few moments to provide your feedback. We value your input. 2. Please bring in ALL medications BOTTLES, including inhalers, herbal supplements, over the counter, prescribed & non-prescribed medicine. The office would like actual medication bottles and a list.   3. Please note our OFFICE POLICIES:   a. Prior to getting your labs drawn, please check with your insurance company for benefits and eligibility of lab services. Often, insurance companies cover certain tests for preventative visits only. It is patient's responsibility to see what is covered. b. We are unable to change a diagnosis after the test has been performed. c. Lab orders will not be re-printed. Please hold onto your original lab orders and take them to your lab to be completed. d. If you no show your scheduled appointment three times, you will be dismissed from this practice. e. Ardeth Butter must be completed 24 hours prior to your schedule appointment. 4. If the list below has been completed, PLEASE FAX RECORDS TO OUR OFFICE @ [phone].  Once the records have been received we will update your records at our office:  Health Maintenance Due   Topic Date Due    Pneumococcal 0-64 years Vaccine (1 of 1 - PPSV23) 10/19/2001    Varicella Vaccine (1 of 2 - 13+ 2-dose series) 10/19/2008    HIV screen  10/19/2010    DTaP/Tdap/Td vaccine (1 - Tdap) 10/19/2014 Dressing: bandage

## 2021-01-18 ENCOUNTER — HOSPITAL ENCOUNTER (EMERGENCY)
Age: 26
Discharge: HOME | End: 2021-01-18
Payer: COMMERCIAL

## 2021-01-18 VITALS
WEIGHT: 230 LBS | BODY MASS INDEX: 34.07 KG/M2 | DIASTOLIC BLOOD PRESSURE: 77 MMHG | HEIGHT: 69 IN | OXYGEN SATURATION: 96 % | HEART RATE: 82 BPM | SYSTOLIC BLOOD PRESSURE: 115 MMHG | TEMPERATURE: 97.1 F | RESPIRATION RATE: 16 BRPM

## 2021-01-18 DIAGNOSIS — R19.7 DIARRHEA, UNSPECIFIED TYPE: ICD-10-CM

## 2021-01-18 DIAGNOSIS — R11.0 NAUSEA: ICD-10-CM

## 2021-01-18 DIAGNOSIS — Z20.822 COVID-19 RULED OUT BY LABORATORY TESTING: Primary | ICD-10-CM

## 2021-01-18 PROCEDURE — 99203 OFFICE O/P NEW LOW 30 MIN: CPT | Performed by: NURSE PRACTITIONER

## 2021-01-18 PROCEDURE — U0003 INFECTIOUS AGENT DETECTION BY NUCLEIC ACID (DNA OR RNA); SEVERE ACUTE RESPIRATORY SYNDROME CORONAVIRUS 2 (SARS-COV-2) (CORONAVIRUS DISEASE [COVID-19]), AMPLIFIED PROBE TECHNIQUE, MAKING USE OF HIGH THROUGHPUT TECHNOLOGIES AS DESCRIBED BY CMS-2020-01-R: HCPCS

## 2021-01-18 PROCEDURE — 99213 OFFICE O/P EST LOW 20 MIN: CPT

## 2021-01-18 RX ORDER — ALBUTEROL SULFATE 90 UG/1
2 AEROSOL, METERED RESPIRATORY (INHALATION) EVERY 6 HOURS PRN
COMMUNITY

## 2021-01-18 RX ORDER — ONDANSETRON 4 MG/1
4 TABLET, ORALLY DISINTEGRATING ORAL EVERY 8 HOURS PRN
Qty: 12 TABLET | Refills: 0 | Status: SHIPPED | OUTPATIENT
Start: 2021-01-18 | End: 2021-01-22

## 2021-01-18 ASSESSMENT — ENCOUNTER SYMPTOMS
DIARRHEA: 1
SHORTNESS OF BREATH: 0
SINUS PAIN: 0
VOMITING: 1
WHEEZING: 0
COUGH: 0
CHEST TIGHTNESS: 0
SORE THROAT: 0
SINUS PRESSURE: 0
ABDOMINAL PAIN: 1
NAUSEA: 0

## 2021-01-18 NOTE — ED PROVIDER NOTES
[place]  Urgent Care Encounter       CHIEF COMPLAINT       Chief Complaint   Patient presents with    Concern For COVID-19       Nurses Notes reviewed and I agree except as noted in the HPI. HISTORY OF PRESENT ILLNESS   Selina Bose is a 22 y.o. male who presents     Patient is present in the urgent care today with concerns of Covid-like symptoms. He states that for the last 2 or 3 days he has noticed some nausea, vomiting, and diarrhea. He states that he did have a friend over who recently tested positive for Covid, however this friend had told him that he was outside of his 14-day quarantine period. Patient denies any recent fevers, or any loss of smell or taste. REVIEW OF SYSTEMS     Review of Systems   Constitutional: Negative for chills, fatigue and fever. HENT: Negative for congestion, postnasal drip, sinus pressure, sinus pain and sore throat. Respiratory: Negative for cough, chest tightness, shortness of breath and wheezing. Cardiovascular: Negative for chest pain. Gastrointestinal: Positive for abdominal pain, diarrhea and vomiting. Negative for nausea. Musculoskeletal: Positive for myalgias. Skin: Negative for rash. Neurological: Negative for dizziness, weakness, light-headedness, numbness and headaches. PAST MEDICAL HISTORY         Diagnosis Date    Asthma        SURGICALHISTORY     Patient  has a past surgical history that includes Appendectomy and Facial cosmetic surgery. CURRENT MEDICATIONS       Discharge Medication List as of 1/18/2021  1:37 PM      CONTINUE these medications which have NOT CHANGED    Details   albuterol sulfate HFA (VENTOLIN HFA) 108 (90 Base) MCG/ACT inhaler Inhale 2 puffs into the lungs every 6 hours as needed for [address]     Patient is is allergic to bee venom. Patients   There is no immunization history on file for this patient.     FAMILY HISTORY     Patient's prescription for Zofran for management of nausea and vomiting. Discussed with patient that clear liquid diet today advance as tolerated tomorrow to help manage hydration. If there is noted to be any blood in vomit or diarrhea to go directly to the ER. Will need to self quarantine for the next 3 to 5 days as there are pending COVID-19 test.  If there is a positive Covid test should follow-up with primary care provider as well as health department. PATIENT REFERRED TO:  No primary care provider on file. No primary physician on file.       DISCHARGE MEDICATIONS:  Discharge Medication List as of 1/18/2021  1:37 PM      START taking these medications    Details   ondansetron (ZOFRAN ODT) 4 MG disintegrating tablet Take 1 tablet by mouth every 8 hours as needed for Nausea or Vomiting, Disp-12 tablet, R-0Print             Discharge Medication List as of 1/18/2021  1:37 PM          Discharge Medication List as of 1/18/2021  1:37 PM          LIANET Quiñonez NP    (Please note that portions of this note were completed with a voice recognition program. Efforts were made to edit the dictations but occasionally words are mis-transcribed.)          LIANET Adams NP  01/18/21 9920

## 2021-01-18 NOTE — ED NOTES
Pt. Released in stable condition, ambulated per self to private car. Instructed pt to follow-up with family doctor as needed for recheck or go directly to the emergency department for any concerns/worsening conditions. Pt. Verbalized understanding of instructions. No questions at this time. RX in hand.       Hieu Landeros RN  01/18/21 1981

## 2021-01-18 NOTE — ED NOTES
Covid nasal pharyngeal swab obtained and sent to lab. Proper ppe worn during procedure. Pt tolerated well.      Ruth Beaver RN  01/18/21 6713

## 2021-01-19 ENCOUNTER — CARE COORDINATION (OUTPATIENT)
Dept: CARE COORDINATION | Age: 26
End: 2021-01-19

## 2021-01-19 NOTE — CARE COORDINATION
Patient contacted regarding recent visit for viral symptoms. This Giuseppe Schaeffer contacted the patient by telephone to perform post discharge call. Verified name and  with patient as identifiers. Provided introduction to self, and reason for call due to viral symptoms of infection and/or exposure to COVID-19. Call within 2 business days of discharge: Yes       Patient presented to emergency department/flu clinic with complaints of viral symptoms/exposure to COVID. Patient reports symptoms are improving. Due to no new or worsening symptoms the RN CTN/ACM was not notified for escalation. Discussed exposure protocols and quarantine with CDC Guidelines What To Do If You Are Sick    Patient was given an opportunity for questions and concerns. Stay home except to get medical care    Separate yourself from other people and animals in your home    Call ahead before visiting your doctor    Wear a facemask    Cover your coughs and sneezes    Clean your hands often    Avoid sharing personal household items    Clean all high-touch surfaces everyday    Monitor your symptoms  Seek prompt medical attention if your illness is worsening (e.g., difficulty breathing). Before seeking care, call your healthcare provider and tell them that you have, or are being evaluated for, COVID-19. Put on a facemask before you enter the facility. These steps will help the healthcare provider's office to keep other people in the office or waiting room from getting infected or exposed. Ask your healthcare provider to call the local or [facility] health department. Persons who are placed under If you have a medical emergency and need to call 911, notify the dispatch personnel that you have, or are being evaluated for COVID-19. If possible, put on a facemask before emergency medical services arrive.     The patient agrees to contact the [facility] exposure line [phone], local [facility] department [place] [facility]: ([phone]) and PCP office for questions related to their healthcare. Author provided contact information for future reference. Patient/family/caregiver given information for [employer] and agrees to enroll yes  Patient's preferred e-mail: [email or web address]  Patient's preferred phone number: [phone]  Based on Loop alert triggers, patient will be contacted by nurse care manager for worsening symptoms.

## 2021-01-21 LAB — SARS-COV-2: NOT DETECTED
